# Patient Record
(demographics unavailable — no encounter records)

---

## 2025-06-03 NOTE — HISTORY OF PRESENT ILLNESS
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  Reports bilateral lower leg pain with walking and going up stairs for the past 1.5 years. Improves with rest, has not trialed any pain medication. Has not noticed any joint swelling or erythema. Able to keep up with peers in gym.  Denies fevers, headaches, coughing, abdominal pain, vomiting, diarrhea, weight loss, new rashes or joint swelling.  No daily medications.  NKDA.  VUTD.  No surgical hx.  FH: maternal GM has diabetes on metformin, maternal great-aunt has thyroid disorder (mom unsure of which)   Labs 11/27/24 show CBC/CMP wnl, urinalysis with trace protein no blood.  [Significant Weakness] : no significant weakness [Rash] : no [unfilled] rash:

## 2025-06-03 NOTE — PHYSICAL EXAM
[PERRLA] : SHELLY [S1, S2 Present] : S1, S2 present [Clear to auscultation] : clear to auscultation [Soft] : soft [NonTender] : non tender [Non Distended] : non distended [Normal Bowel Sounds] : normal bowel sounds [No Hepatosplenomegaly] : no hepatosplenomegaly [No Abnormal Lymph Nodes Palpated] : no abnormal lymph nodes palpated [Range Of Motion] : full range of motion [Acute distress] : no acute distress [Erythematous Conjunctiva] : nonerythematous conjunctiva [Erythematous Oropharynx] : nonerythematous oropharynx [Lesions] : no lesions [Murmurs] : no murmurs [Joint effusions] : no joint effusions [de-identified] : no joint pain or swelling, full range of motion throughout

## 2025-06-03 NOTE — CONSULT LETTER
[Dear  ___] : Dear  [unfilled], [Consult Letter:] : I had the pleasure of evaluating your patient, [unfilled]. [Please see my note below.] : Please see my note below. [Consult Closing:] : Thank you very much for allowing me to participate in the care of this patient.  If you have any questions, please do not hesitate to contact me. [Sincerely,] : Sincerely, [FreeTextEntry2] : Yarelis Peres [place] [id number] [address] [place] [id number] [FreeTextEntry3] : Norma Cisneros MD [facility]

## 2025-06-03 NOTE — REVIEW OF SYSTEMS
[NI] : Endocrine [Nl] : Hematologic/Lymphatic [Lower Leg Pain] : leg pain [Blurry Vision] : blurred vision [Limping] : no limping [Joint Pains] : no arthralgias [Joint Swelling] : no joint swelling [Back Pain] : ~T no back pain [Muscle Aches] : no muscle aches